# Patient Record
Sex: MALE | Race: AMERICAN INDIAN OR ALASKA NATIVE
[De-identification: names, ages, dates, MRNs, and addresses within clinical notes are randomized per-mention and may not be internally consistent; named-entity substitution may affect disease eponyms.]

---

## 2020-05-04 ENCOUNTER — HOSPITAL ENCOUNTER (EMERGENCY)
Dept: HOSPITAL 5 - ED | Age: 65
Discharge: HOME | End: 2020-05-04
Payer: MEDICARE

## 2020-05-04 VITALS — SYSTOLIC BLOOD PRESSURE: 138 MMHG | DIASTOLIC BLOOD PRESSURE: 79 MMHG

## 2020-05-04 DIAGNOSIS — Z79.899: ICD-10-CM

## 2020-05-04 DIAGNOSIS — D64.9: ICD-10-CM

## 2020-05-04 DIAGNOSIS — M25.561: Primary | ICD-10-CM

## 2020-05-04 DIAGNOSIS — M25.461: ICD-10-CM

## 2020-05-04 DIAGNOSIS — I10: ICD-10-CM

## 2020-05-04 DIAGNOSIS — Z86.73: ICD-10-CM

## 2020-05-04 DIAGNOSIS — I25.2: ICD-10-CM

## 2020-05-04 DIAGNOSIS — Z98.890: ICD-10-CM

## 2020-05-04 LAB
BASOPHILS # (AUTO): 0.1 K/MM3 (ref 0–0.1)
BASOPHILS NFR BLD AUTO: 1 % (ref 0–1.8)
CRP SERPL-MCNC: 3.4 MG/DL (ref 0–1.3)
EOSINOPHIL # BLD AUTO: 0.1 K/MM3 (ref 0–0.4)
EOSINOPHIL NFR BLD AUTO: 1.2 % (ref 0–4.3)
HCT VFR BLD CALC: 27.5 % (ref 35.5–45.6)
HGB BLD-MCNC: 9 GM/DL (ref 11.8–15.2)
LYMPHOCYTES # BLD AUTO: 1.3 K/MM3 (ref 1.2–5.4)
LYMPHOCYTES NFR BLD AUTO: 18 % (ref 13.4–35)
MCHC RBC AUTO-ENTMCNC: 33 % (ref 32–34)
MCV RBC AUTO: 101 FL (ref 84–94)
MONOCYTES # (AUTO): 0.3 K/MM3 (ref 0–0.8)
MONOCYTES % (AUTO): 4.8 % (ref 0–7.3)
PLATELET # BLD: 356 K/MM3 (ref 140–440)
RBC # BLD AUTO: 2.73 M/MM3 (ref 3.65–5.03)
URATE SERPL-MCNC: 4.7 MG/DL (ref 3.5–7.6)

## 2020-05-04 PROCEDURE — 99284 EMERGENCY DEPT VISIT MOD MDM: CPT

## 2020-05-04 PROCEDURE — 84550 ASSAY OF BLOOD/URIC ACID: CPT

## 2020-05-04 PROCEDURE — 73562 X-RAY EXAM OF KNEE 3: CPT

## 2020-05-04 PROCEDURE — 96372 THER/PROPH/DIAG INJ SC/IM: CPT

## 2020-05-04 PROCEDURE — 86140 C-REACTIVE PROTEIN: CPT

## 2020-05-04 PROCEDURE — 85652 RBC SED RATE AUTOMATED: CPT

## 2020-05-04 PROCEDURE — 85025 COMPLETE CBC W/AUTO DIFF WBC: CPT

## 2020-05-04 PROCEDURE — 36415 COLL VENOUS BLD VENIPUNCTURE: CPT

## 2020-05-04 NOTE — EMERGENCY DEPARTMENT REPORT
ED Extremity Problem HPI





- General


Chief complaint: Extremity Injury, Lower


Stated complaint: RT KNEE PAIN


Time Seen by Provider: 05/04/20 18:19


Source: patient


Mode of arrival: Ambulatory


Limitations: No Limitations





- History of Present Illness


Initial comments: 





Patient is a 64-year-old male who presents emergency room with complaints of 

right knee swelling and pain that began yesterday.  He states he has increased 

warmth.  He denies any fall or injury.  He states that he does walk with a cane.

 He denies any numbness or weakness.  He denies ever having this in the past.  

He denies any fever, chills, nausea vomiting diarrhea, any other symptoms.  He 

denies any allergies to medications.


Severity scale (0 -10): 10





- Related Data


                                  Previous Rx's











 Medication  Instructions  Recorded  Last Taken  Type


 


Amiodarone [Cordarone 200 MG TAB] 400 mg PO BID #60 tablet 07/24/15 Unknown Rx


 


Famotidine [Pepcid] 20 mg PO BID #60 tablet 07/24/15 Unknown Rx


 


Folic Acid [Folvite] 1 mg PO QDAY #30 tablet 07/24/15 Unknown Rx


 


Multivitamin Tab W-MINERAL 1 each PO QDAY #30 tablet 07/24/15 Unknown Rx





[Multiple Vitamin/Mineral    





(Theragran M)]    


 


Simvastatin (Nf) [Zocor TAB] 20 mg PO QHS #30 tablet 07/24/15 Unknown Rx


 


Thiamine [Vitamin B-1] 100 mg PO QDAY #30 tablet 07/24/15 Unknown Rx


 


lisinopriL [Zestril TAB] 5 mg PO QDAY #30 tablet 07/24/15 Unknown Rx


 


Doxycycline Hyclate [Doxycycline 100 mg PO BID 10 Days #20 tab 05/04/20 Unknown 

Rx





Hyclate TAB]    


 


HYDROcodone/APAP 5-325 [[place] 1 each PO Q6HR PRN #10 tablet 05/04/20 Unknown Rx





5/325]    


 


Ibuprofen [Motrin 600 MG tab] 600 mg PO Q8H PRN #15 tablet 05/04/20 Unknown Rx











                                    Allergies











Allergy/AdvReac Type Severity Reaction Status Date / Time


 


No Known Allergies Allergy   Unverified 07/19/15 07:08














ED Review of Systems


ROS: 


Stated complaint: RT KNEE PAIN


Other details as noted in HPI





Comment: All other systems reviewed and negative





ED Past Medical Hx





- Past Medical History


Previous Medical History?: Yes


Hx Hypertension: Yes


Hx CVA: Yes (8/2015)


Hx Heart Attack/AMI: Yes (9/2015)


Hx Congestive Heart Failure: No


Hx Diabetes: No


Hx Asthma: No


Hx COPD: No


Hx HIV: No





- Surgical History


Past Surgical History?: No


Additional Surgical History: Left Rotator Cuff.  "something with my heart"





- Social History


Smoking Status: Never Smoker


Substance Use Type: None





- Medications


Home Medications: 


                                Home Medications











 Medication  Instructions  Recorded  Confirmed  Last Taken  Type


 


Amiodarone [Cordarone 200 MG TAB] 400 mg PO BID #60 tablet 07/24/15  Unknown Rx


 


Famotidine [Pepcid] 20 mg PO BID #60 tablet 07/24/15  Unknown Rx


 


Folic Acid [Folvite] 1 mg PO QDAY #30 tablet 07/24/15  Unknown Rx


 


Multivitamin Tab W-MINERAL 1 each PO QDAY #30 tablet 07/24/15  Unknown Rx





[Multiple Vitamin/Mineral     





(Theragran M)]     


 


Simvastatin (Nf) [Zocor TAB] 20 mg PO QHS #30 tablet 07/24/15  Unknown Rx


 


Thiamine [Vitamin B-1] 100 mg PO QDAY #30 tablet 07/24/15  Unknown Rx


 


lisinopriL [Zestril TAB] 5 mg PO QDAY #30 tablet 07/24/15  Unknown Rx


 


Doxycycline Hyclate [Doxycycline 100 mg PO BID 10 Days #20 tab 05/04/20  Unknown

 Rx





Hyclate TAB]     


 


HYDROcodone/APAP 5-325 [[place] 1 each PO Q6HR PRN #10 tablet 05/04/20  Unknown Rx





5/325]     


 


Ibuprofen [Motrin 600 MG tab] 600 mg PO Q8H PRN #15 tablet 05/04/20  Unknown Rx














ED Physical Exam





- General


Limitations: No Limitations


General appearance: alert, in no apparent distress





- Head


Head exam: Present: atraumatic, normocephalic





- Eye


Eye exam: Present: normal appearance





- ENT


ENT exam: Present: mucous membranes moist





- Extremities Exam


Extremities exam: Present: other (edema present to the right knee mostly 

anterior, generalized ttp of the right knee, decreased ROM of the right knee 

secondary to pain, mild erythema of the right anterior knee, neurovascularly 

intact, no calf ttp, no posterior knee ttp)





- Neurological Exam


Neurological exam: Present: alert, oriented X3





- Psychiatric


Psychiatric exam: Present: normal affect, normal mood





- Skin


Skin exam: Present: warm, dry





ED Course


                                   Vital Signs











  05/04/20 05/04/20 05/04/20





  17:29 20:02 20:05


 


Temperature 98.6 F  98.8 F


 


Pulse Rate 100 H  92 H


 


Respiratory 18 18 18





Rate   


 


Blood Pressure 132/68  


 


Blood Pressure   138/79





[Left]   


 


O2 Sat by Pulse 100  97





Oximetry   














- Consultations


Consultation #1: 





05/04/20 20:50


Discussed case with Dr. Esquivel, orthopedic, he believes most likely due to 

inflammatory arthritis, he does not believe it is due to a septic joint or 

infection, he states to give the patient Motrin, he states to have the patient 

walk with a cane or crutches, he states patient can follow-up with his primary 

care doctor or with Dr. Esquivel.


05/04/20 20:57


Discussed case with Dr. Lewis, ER attending who recommended to cover patient 

with antibiotics such and to discuss strict return precautions











ED Medical Decision Making





- Lab Data


Result diagrams: 


                                 05/04/20 19:44











                                   Lab Results











  05/04/20 05/04/20 Range/Units





  19:44 19:44 


 


WBC  7.1   (4.5-11.0)  K/mm3


 


RBC  2.73 L   (3.65-5.03)  M/mm3


 


Hgb  9.0 L   (11.8-15.2)  gm/dl


 


Hct  27.5 L   (35.5-45.6)  %


 


MCV  101 H   (84-94)  fl


 


MCH  33 H   (28-32)  pg


 


MCHC  33   (32-34)  %


 


RDW  14.3   (13.2-15.2)  %


 


Plt Count  356   (140-440)  K/mm3


 


Lymph % (Auto)  18.0   (13.4-35.0)  %


 


Mono % (Auto)  4.8   (0.0-7.3)  %


 


Eos % (Auto)  1.2   (0.0-4.3)  %


 


Baso % (Auto)  1.0   (0.0-1.8)  %


 


Lymph #  1.3   (1.2-5.4)  K/mm3


 


Mono #  0.3   (0.0-0.8)  K/mm3


 


Eos #  0.1   (0.0-0.4)  K/mm3


 


Baso #  0.1   (0.0-0.1)  K/mm3


 


Seg Neutrophils %  75.0 H   (40.0-70.0)  %


 


Seg Neutrophils #  5.3   (1.8-7.7)  K/mm3


 


ESR  84   (0-20)  mm/Hr


 


Uric Acid   4.7  (3.5-7.6)  mg/dL


 


C-Reactive Protein   3.40 H  (0.00-1.30)  mg/dL














- Radiology Data


Radiology results: report reviewed





RIGHT KNEE 3 VIEWS 





INDICATION / CLINICAL INFORMATION: 


Right knee pain and swelling. 





COMPARISON: 


None available. 





FINDINGS: 





BONES / JOINT(S): No acute fracture or subluxation. There is spurring involving 

the anterior 


 patella. 


SOFT TISSUES: There is moderate localized soft tissue swelling anterior to the 

patella and along 


 the medial aspect of the knee. 





ADDITIONAL FINDINGS: None. 





IMPRESSION: Moderate soft tissue swelling predominantly anterior to the patella 

is characteristic 


 of soft tissue injury. No evidence of fracture. 





Signer Name: Cristiano Hughes MD 


Signed: 5/4/2020 6:59 PM 


Workstation Name: [id number] 








 Transcribed By: RT 


 Dictated By: Cristiano Hughes MD 


 Electronically Authenticated By: Cristiano Hughes MD 


 Signed Date/Time: 05/04/20 1859 











 DD/DT: 05/04/20 1857 


 TD/TT: 





- Medical Decision Making





Patient is a 64-year-old male who presents emergency room with complaints of 

right knee swelling and pain that began yesterday.  He states he has increased 

warmth.  He denies any fall or injury.  He states that he does walk with a cane.

  He denies any numbness or weakness.  He denies ever having this in the past.  

He denies any fever, chills, nausea vomiting diarrhea, any other symptoms.  He 

denies any allergies to medications.  Vitals are stable.  On exam: edema present

 to the right knee mostly anterior, generalized ttp of the right knee, decreased

 ROM of the right knee secondary to pain, mild erythema of the right anterior kn

ee, neurovascularly intact, no calf ttp, no posterior knee ttp. XR right knee: 

Moderate soft tissue swelling predominantly anterior to the patella is 

characteristic of soft tissue injury. No evidence of fracture. labs with 

macrocytic anemia, H/H stable at 9/27.5, no leukocytosis, ESR is normal, uric 

acid is normal, CRP is mildly elevated. Discussed case with Dr. Esquivel, 

orthopedic, he believes most likely due to inflammatory arthritis, he does not 

believe it is due to a septic joint or infection, he states to give the patient 

Motrin, he states to have the patient walk with a cane or crutches, he states 

patient can follow-up with his primary care doctor or with Dr. Esquivel. Discussed

 case with Dr. Lewis, ER attending who recommended to cover patient with 

antibiotics such and to discuss strict return precautions.  Patient given 

doxycycline, Norco, ibuprofen. adivsed pt Please take medication as prescribed. 

 Do not drive or operate heavy machinery while taking pain medication.  Please 

follow-up with orthopedic doctor.  Please follow-up with your primary care 

doctor.  Please discuss your anemia with your primary care doctor.  Please use 

your cane while walking to decrease weightbearing.  Return to the emergency room

 immediately for any new or worsening symptoms including but not limited to 

worsening swelling, worsening redness, worsening pain, fever, etc.





- Differential Diagnosis


RA, gout, arthritis, septic joint, cellulitis, bursitis


Critical care attestation.: 


If time is entered above; I have spent that time in minutes in the direct care 

of this critically ill patient, excluding procedure time.








ED Disposition


Clinical Impression: 


 Swelling of knee joint, right





Right knee pain


Qualifiers:


 Chronicity: acute Qualified Code(s): M25.561 - Pain in right knee





Anemia


Qualifiers:


 Anemia type: unspecified type Qualified Code(s): D64.9 - Anemia, unspecified





Disposition: DC-01 TO HOME OR SELFCARE


Is pt being admited?: No


Does the pt Need Aspirin: No


Condition: Stable


Instructions:  Knee Pain (ED), Anemia (ED)


Additional Instructions: 


Please take medication as prescribed.  Do not drive or operate heavy machinery 

while taking pain medication.  Please follow-up with orthopedic doctor.  Please 

follow-up with your primary care doctor.  Please discuss your anemia with your 

primary care doctor.  Please use your cane while walking to decrease 

weightbearing.  Return to the emergency room immediately for any new or 

worsening symptoms including but not limited to worsening swelling, worsening 

redness, worsening pain, fever, etc.


Prescriptions: 


Doxycycline Hyclate [Doxycycline Hyclate TAB] 100 mg PO BID 10 Days #20 tab


Ibuprofen [Motrin 600 MG tab] 600 mg PO Q8H PRN #15 tablet


 PRN Reason: Pain


HYDROcodone/APAP 5-325 [[place] 5/325] 1 each PO Q6HR PRN #10 tablet


 PRN Reason: Pain , Severe (7-10)


Referrals: 


MADDY RODRIGUEZ MD [Primary Care Provider] - 2-3 Days


CRISTIANO ESQUIVEL MD [Staff Physician] - 2-3 Days


Time of Disposition: 20:54


Print Language: ENGLISH

## 2020-05-04 NOTE — XRAY REPORT
RIGHT KNEE 3 VIEWS



INDICATION / CLINICAL INFORMATION:

Right knee pain and swelling.



COMPARISON:

None available.

 

FINDINGS:



BONES / JOINT(S): No acute fracture or subluxation. There is spurring involving the anterior patella.


SOFT TISSUES: There is moderate localized soft tissue swelling anterior to the patella and along the 
medial aspect of the knee.



ADDITIONAL FINDINGS: None.



IMPRESSION: Moderate soft tissue swelling predominantly anterior to the patella is characteristic of 
soft tissue injury. No evidence of fracture.



Signer Name: Cristiano Hughes MD 

Signed: 5/4/2020 6:59 PM

Workstation Name: [id number]

## 2021-07-19 ENCOUNTER — HOSPITAL ENCOUNTER (EMERGENCY)
Dept: HOSPITAL 5 - ED | Age: 66
Discharge: LEFT BEFORE DISCHARGE | End: 2021-07-19
Payer: MEDICARE

## 2021-07-19 VITALS — SYSTOLIC BLOOD PRESSURE: 199 MMHG | DIASTOLIC BLOOD PRESSURE: 94 MMHG

## 2021-07-19 DIAGNOSIS — R42: ICD-10-CM

## 2021-07-19 DIAGNOSIS — I10: Primary | ICD-10-CM

## 2021-07-19 DIAGNOSIS — R51.9: ICD-10-CM

## 2021-07-19 DIAGNOSIS — R26.81: ICD-10-CM

## 2021-07-19 DIAGNOSIS — R07.89: ICD-10-CM

## 2021-07-19 DIAGNOSIS — I25.2: ICD-10-CM

## 2021-07-19 DIAGNOSIS — Z79.899: ICD-10-CM

## 2021-07-19 DIAGNOSIS — Z76.0: ICD-10-CM

## 2021-07-19 DIAGNOSIS — Z86.73: ICD-10-CM

## 2021-07-19 DIAGNOSIS — R55: ICD-10-CM

## 2021-07-19 DIAGNOSIS — Z98.890: ICD-10-CM

## 2021-07-19 DIAGNOSIS — Z88.0: ICD-10-CM

## 2021-07-19 LAB
ALBUMIN SERPL-MCNC: 3.5 G/DL (ref 3.9–5)
ALT SERPL-CCNC: 6 UNITS/L (ref 7–56)
APTT BLD: 34.2 SEC. (ref 24.2–36.6)
BASOPHILS # (AUTO): 0 K/MM3 (ref 0–0.1)
BASOPHILS NFR BLD AUTO: 0.1 % (ref 0–1.8)
BUN SERPL-MCNC: 24 MG/DL (ref 9–20)
BUN/CREAT SERPL: 14 %
CALCIUM SERPL-MCNC: 8.9 MG/DL (ref 8.4–10.2)
EOSINOPHIL # BLD AUTO: 0.1 K/MM3 (ref 0–0.4)
EOSINOPHIL NFR BLD AUTO: 0.9 % (ref 0–4.3)
HCT VFR BLD CALC: 32.5 % (ref 35.5–45.6)
HEMOLYSIS INDEX: 7
HGB BLD-MCNC: 10.8 GM/DL (ref 11.8–15.2)
INR PPP: 1.15 (ref 0.87–1.13)
LYMPHOCYTES # BLD AUTO: 1 K/MM3 (ref 1.2–5.4)
LYMPHOCYTES NFR BLD AUTO: 13 % (ref 13.4–35)
MCHC RBC AUTO-ENTMCNC: 33 % (ref 32–34)
MCV RBC AUTO: 101 FL (ref 84–94)
MONOCYTES # (AUTO): 0.9 K/MM3 (ref 0–0.8)
MONOCYTES % (AUTO): 11.3 % (ref 0–7.3)
PLATELET # BLD: 227 K/MM3 (ref 140–440)
RBC # BLD AUTO: 3.22 M/MM3 (ref 3.65–5.03)

## 2021-07-19 PROCEDURE — 82962 GLUCOSE BLOOD TEST: CPT

## 2021-07-19 PROCEDURE — 82550 ASSAY OF CK (CPK): CPT

## 2021-07-19 PROCEDURE — 84484 ASSAY OF TROPONIN QUANT: CPT

## 2021-07-19 PROCEDURE — 36415 COLL VENOUS BLD VENIPUNCTURE: CPT

## 2021-07-19 PROCEDURE — 85730 THROMBOPLASTIN TIME PARTIAL: CPT

## 2021-07-19 PROCEDURE — 80053 COMPREHEN METABOLIC PANEL: CPT

## 2021-07-19 PROCEDURE — 96360 HYDRATION IV INFUSION INIT: CPT

## 2021-07-19 PROCEDURE — 99284 EMERGENCY DEPT VISIT MOD MDM: CPT

## 2021-07-19 PROCEDURE — 84443 ASSAY THYROID STIM HORMONE: CPT

## 2021-07-19 PROCEDURE — G0480 DRUG TEST DEF 1-7 CLASSES: HCPCS

## 2021-07-19 PROCEDURE — 85670 THROMBIN TIME PLASMA: CPT

## 2021-07-19 PROCEDURE — 71045 X-RAY EXAM CHEST 1 VIEW: CPT

## 2021-07-19 PROCEDURE — 80320 DRUG SCREEN QUANTALCOHOLS: CPT

## 2021-07-19 PROCEDURE — 70450 CT HEAD/BRAIN W/O DYE: CPT

## 2021-07-19 PROCEDURE — 85610 PROTHROMBIN TIME: CPT

## 2021-07-19 PROCEDURE — 82553 CREATINE MB FRACTION: CPT

## 2021-07-19 PROCEDURE — 93005 ELECTROCARDIOGRAM TRACING: CPT

## 2021-07-19 PROCEDURE — 85025 COMPLETE CBC W/AUTO DIFF WBC: CPT

## 2021-07-19 PROCEDURE — 83735 ASSAY OF MAGNESIUM: CPT

## 2021-07-19 NOTE — EMERGENCY DEPARTMENT REPORT
ED General Adult HPI





- General


Chief complaint: Chest Pain


Stated complaint: CHEST PAIN/DIZZY


PUI?: No


Time Seen by Provider: 07/19/21 17:20


Source: patient, RN notes reviewed, old records reviewed


Mode of arrival: Ambulatory


Limitations: Physical Limitation





- History of Present Illness


Initial comments: 





The patient is a 65-year-old gentleman.  The patient has a past medical history 

of alcohol abuse, nonsustained ventricular tachycardia, respiratory failure, 

polysubstance abuse


He has not received his Covid vaccination.





He presents to the ER today with complaint of 1 week intermittent chest pain, 

headache, dizziness, unsteady gait.  Positive congestion.  No loss of taste or 

smell.  No fever.  No vomiting or diaphoresis.  No travel, surgery, 

immobilization, denies DVT and pulmonary embolism risk factors.  He is not mehreen

icidal suicidal.





Symptoms intermittent.  They do not radiate anywhere.  He does not describe 

exacerbating or relieving factors that he is aware of.  He does not have a local

primary care doctor.  Noncompliant with outpatient medications.





He does describe intermittent central chest pain, which does not radiate to the 

back, arms or neck.  This has been intermittent, and is associated with coughing

.





He may have taken aspirin within the past 7 days, but he is not certain.  No 

recent cardiac risk ratification that he is aware of.


-: Gradual, days(s)


Location: head, chest


Consistency: intermittent


Improves with: none


Worsens with: none





- Related Data


                                  Previous Rx's











 Medication  Instructions  Recorded  Last Taken  Type


 


Amiodarone [Cordarone 200 MG TAB] 400 mg PO BID #60 tablet 07/24/15 Unknown Rx


 


Simvastatin (Nf) [Zocor TAB] 20 mg PO QHS #30 tablet 07/24/15 Unknown Rx


 


Aspirin [Aspirin BABY CHEW TAB] 81 mg PO QDAY #30 tab.chew 07/19/21 Unknown Rx


 


Famotidine [Pepcid] 20 mg PO BID #60 tablet 07/19/21 Unknown Rx


 


Folic Acid [Folvite] 1 mg PO QDAY #30 tablet 07/19/21 Unknown Rx


 


Multivitamin Tab W-MINERAL 1 each PO QDAY #30 tablet 07/19/21 Unknown Rx





[Multiple Vitamin/Mineral    





(Theragran M)]    


 


Thiamine [Vitamin B-1] 100 mg PO QDAY #30 tablet 07/19/21 Unknown Rx


 


lisinopriL [Zestril TAB] 5 mg PO QDAY #30 tablet 07/19/21 Unknown Rx











                                    Allergies











Allergy/AdvReac Type Severity Reaction Status Date / Time


 


Penicillins Allergy Mild Rash Verified 07/19/21 18:03














ED Review of Systems


ROS: 


Stated complaint: CHEST PAIN/DIZZY


Other details as noted in HPI





Constitutional: malaise, weakness, other (Denies loss of taste or smell).  

denies: fever


Eyes: denies: eye discharge, vision change


ENT: denies: epistaxis


Respiratory: denies: cough


Cardiovascular: chest pain


Gastrointestinal: denies: nausea, vomiting, hematemesis, melena, hematochezia


Musculoskeletal: myalgia


Neurological: headache, weakness, abnormal gait


Psychiatric: denies: homicidal thoughts, suicidal thoughts





ED Past Medical Hx





- Past Medical History


Previous Medical History?: Yes


Hx Hypertension: Yes


Hx CVA: Yes (8/2015)


Hx Heart Attack/AMI: Yes (9/2015)


Hx Congestive Heart Failure: No


Hx Diabetes: No


Hx Asthma: No


Hx COPD: No


Hx HIV: No





- Surgical History


Additional Surgical History: Left Rotator Cuff.  "something with my heart"





- Social History


Smoking Status: Never Smoker


Substance Use Type: None





- Medications


Home Medications: 


                                Home Medications











 Medication  Instructions  Recorded  Confirmed  Last Taken  Type


 


Amiodarone [Cordarone 200 MG TAB] 400 mg PO BID #60 tablet 07/24/15  Unknown Rx


 


Simvastatin (Nf) [Zocor TAB] 20 mg PO QHS #30 tablet 07/24/15  Unknown Rx


 


Aspirin [Aspirin BABY CHEW TAB] 81 mg PO QDAY #30 tab.chew 07/19/21  Unknown Rx


 


Famotidine [Pepcid] 20 mg PO BID #60 tablet 07/19/21  Unknown Rx


 


Folic Acid [Folvite] 1 mg PO QDAY #30 tablet 07/19/21  Unknown Rx


 


Multivitamin Tab W-MINERAL 1 each PO QDAY #30 tablet 07/19/21  Unknown Rx





[Multiple Vitamin/Mineral     





(Theragran M)]     


 


Thiamine [Vitamin B-1] 100 mg PO QDAY #30 tablet 07/19/21  Unknown Rx


 


lisinopriL [Zestril TAB] 5 mg PO QDAY #30 tablet 07/19/21  Unknown Rx














ED Physical Exam





- General


Limitations: Physical Limitation


General appearance: alert, anxious, obese





- Head


Head exam: Present: atraumatic, normocephalic





- Eye


Eye exam: Present: normal appearance, PERRL, EOMI.  Absent: nystagmus





- ENT


ENT exam: Present: normal exam, normal orophraynx, mucous membranes moist, 

normal external ear exam, other (Tongue fasciculations noted)





- Neck


Neck exam: Present: normal inspection, full ROM.  Absent: tenderness, 

meningismus





- Respiratory


Respiratory exam: Present: normal lung sounds bilaterally.  Absent: respiratory 

distress, wheezes, rales, rhonchi, stridor, decreased breath sounds





- Cardiovascular


Cardiovascular Exam: Present: normal rhythm, tachycardia, normal heart sounds.  

Absent: bradycardia, irregular rhythm, systolic murmur, diastolic murmur, rubs, 

gallop





- GI/Abdominal


GI/Abdominal exam: Present: soft.  Absent: distended, tenderness, guarding, 

rebound, rigid, pulsatile mass





- Rectal


Rectal exam: Present: deferred





- Extremities Exam


Extremities exam: Present: full ROM, pedal edema (1+ edema noted in the 

bilateral lower extremity), other (2+ pulses noted in the bilateral upper and 

lower extremities.  There is no palpable cord.   negative Homans sign.  Muscular

compartments are soft.  The pelvis is stable.).  Absent: normal inspection 

(Hyperpigmentation and chronic venous stasis changes noted in the bilateral 

lower extremities)





- Back Exam


Back exam: Present: normal inspection.  Absent: tenderness, CVA tenderness (R), 

CVA tenderness (L), paraspinal tenderness, vertebral tenderness





- Neurological Exam


Neurological exam: Present: alert, oriented X3, abnormal gait (Positive Romberg 

examination.  Unable to tolerate tandem gait.  Regular gait within normal 

limits), other (No facial droop.  Tongue midline.  Extraocular movements intact 

bilaterally.  Facial sensation intact to light touch in V1, V2, V3 distribution 

bilaterally.  5 and a 5 strength in 4 extremities.  Sensation intact to light 

touch in 4 extremities.).  Absent: motor sensory deficit





- Psychiatric


Psychiatric exam: Present: anxious.  Absent: homicidal ideation, suicidal 

ideation





- Skin


Skin exam: Present: warm, dry, intact, normal color.  Absent: rash





ED Course


                                   Vital Signs











  07/19/21 07/19/21 07/19/21





  17:11 17:43 17:45


 


Temperature 99 F  


 


Pulse Rate 101 H 92 H 100 H


 


Respiratory 22 17 25 H





Rate   


 


Blood Pressure   175/88


 


Blood Pressure 167/88  





[Right]   


 


O2 Sat by Pulse 97 79 L 91





Oximetry   














  07/19/21 07/19/21 07/19/21





  18:01 18:21 18:31


 


Temperature   


 


Pulse Rate 94 H  


 


Respiratory 21  





Rate   


 


Blood Pressure 192/91 192/91 188/94


 


Blood Pressure   





[Right]   


 


O2 Sat by Pulse 99 98 98





Oximetry   














- Reevaluation(s)


Reevaluation #1: 





07/19/21 18:10


Differential diagnosis, including but not limited to: Dehydration, alcohol 

dependence, electrolyte derangement, alcohol induced gastritis, GERD, gastritis,

 hiatal hernia, pneumonia, acute coronary syndrome, Warnicke's encephalopathy, 

polysubstance abuse, peripheral neuropathy,








Assessment and plan:





65-year-old gentleman with a complaint of headache, chest pain with coughing, 

dizziness, near syncope.  Tachycardia resolved, he is not currently tachycardic,

 tachypneic or hypoxic, he denies travel, surgery, immobilization, and DVT, 

pulmonary embolism risk factors, and he is low risk by Wells criteria.





He is ataxic, this has been going on for about a week, as per the patient.  

Therefore, not a TPA candidate as symptoms present for greater than 4.5 hours.  

Examination not suggestive of large vessel occlusion, and symptoms present for g

reater than 24 hours, therefore, no emergent need for endovascular/arterial 

imaging of the head and neck.





Mild tongue fasciculations noted, known history of polysubstance abuse, the may 

be a component of alcohol withdrawal.  Place patient on cardiac monitor.  Obtain

 noncontrast CT scan of the brain, appropriate laboratory studies, EKG, x-ray of

 the chest.  Treat with banana bag, as well as 500 mg of thiamine.  No nystagmus

 is noted.





I recommended admission to the medical service once initial diagnostics have 

resulted.  I discussed this plan of care with the patient.  He is not certain if

 he would like to be admitted.  He is currently awake, alert, oriented, sober, 

and exhibits decision-making capacity.  Risks of leaving AGAINST MEDICAL ADVICE 

were discussed with the patient, specifically risks of death, disability, 

paralysis, loss of quality of life.  This conversation was witnessed by nurse 

Britta Lisa.


The patient is currently awake, alert, oriented, exhibits decision-making cap

city, and is able to articulate these risks in his own words.





Reassess after initial data points.


07/19/21 18:51





Laboratory studies, radiology studies are reviewed and appreciated.  Patient 

resting comfortably in stretcher at this time.  He does not appear to be in any 

acute distress.  I have again strongly reiterated my recommendation for 

admission for the aforementioned.





The patient is declining/refusing to be admitted.  He understands the risks of 

leaving AGAINST MEDICAL ADVICE, including death, disability, paralysis, stroke, 

and permanent loss of quality of life.  He understands that he may return to the

 emergency room right away if and when he changes his mind.





Return precautions are reviewed.








ED Medical Decision Making





- Lab Data


Result diagrams: 


                                 07/19/21 17:51





                                 07/19/21 17:51








                                   Vital Signs











  07/19/21





  17:11


 


Temperature 99 F


 


Pulse Rate 101 H


 


Respiratory 22





Rate 


 


Blood Pressure 167/88





[Right] 


 


O2 Sat by Pulse 97





Oximetry 











                                   Lab Results











  07/19/21 Range/Units





  17:51 


 


WBC  7.8  (4.5-11.0)  K/mm3


 


RBC  3.22 L  (3.65-5.03)  M/mm3


 


Hgb  10.8 L  (11.8-15.2)  gm/dl


 


Hct  32.5 L  (35.5-45.6)  %


 


MCV  101 H  (84-94)  fl


 


MCH  34 H  (28-32)  pg


 


MCHC  33  (32-34)  %


 


RDW  13.9  (13.2-15.2)  %


 


Plt Count  227  (140-440)  K/mm3


 


Lymph % (Auto)  13.0 L  (13.4-35.0)  %


 


Mono % (Auto)  11.3 H  (0.0-7.3)  %


 


Eos % (Auto)  0.9  (0.0-4.3)  %


 


Baso % (Auto)  0.1  (0.0-1.8)  %


 


Lymph # (Auto)  1.0 L  (1.2-5.4)  K/mm3


 


Mono # (Auto)  0.9 H  (0.0-0.8)  K/mm3


 


Eos # (Auto)  0.1  (0.0-0.4)  K/mm3


 


Baso # (Auto)  0.0  (0.0-0.1)  K/mm3


 


Seg Neutrophils %  74.7 H  (40.0-70.0)  %


 


Seg Neutrophils #  5.8  (1.8-7.7)  K/mm3














- EKG Data


-: EKG Interpreted by Me


EKG shows normal: sinus rhythm


Rate: normal





- EKG Data





07/19/21 18:09


The EKG today is interpreted at 17: 40





Sinus rhythm, 89 bpm.  Normal axis, normal intervals, nonspecific T wave 

abnormalities.  Left ventricular hypertrophy.  Abnormal EKG.  Not a STEMI.





- Radiology Data


Radiology results: pending, report reviewed, image reviewed





CHEST 1 VIEW  INDICATION / CLINICAL INFORMATION: Acute chest pain.  COMPARISON: 

4/3/2016  FINDINGS:  SUPPORT DEVICES: None.  HEART / MEDIASTINUM: No significant

 abnormality.  LUNGS / PLEURA: No significant pulmonary or pleural abnormality. 

No pneumothorax.  ADDITIONAL FINDINGS: No significant additional findings.  

IMPRESSION: 1. No acute findings. No interval change.  Signer Name: Aleena Cleveland MD Signed: 7/19/2021 5:13 PM Workstation Name: [email or web address]


Critical care attestation.: 


If time is entered above; I have spent that time in minutes in the direct care 

of this critically ill patient, excluding procedure time.








ED Disposition


Clinical Impression: 


 Dizziness, Near syncope, Acute chest pain, Headache, HTN (hypertension), 

Medication refill, Unsteady gait, Non-compliance





Disposition: DC-07 LEFT AGAINST MED ADVICE


Is pt being admited?: No


Does the pt Need Aspirin: No


Condition: Good


Instructions:  Chest Pain (ED), Nonspecific Chest Pain, Adult, Near-Syncope, 

Easy-to-Read, Hypertension (ED)


Additional Instructions: 


As we discussed, you have left the hospital/emergency room AGAINST MEDICAL 

ADVICE.  By leaving, you risked death, disability, paralysis, permanent loss of 

quality of life.  The ER is open 24 hours a day, 7 days a week.  It never 

closes.  Please return to the emergency room right away if and when you change 

your mind.  If you decide not to return to the emergency room, please follow-up 

with the listed physician referrals as soon as possible.





Do not drive or operate motor vehicles for the next 6 months, or until cleared 

to do so by a primary care doctor or cardiologist or neurologist.  For the 

patient's convenience, a number of local primary care/cardiology/neurology 

specialists have been listed that he may follow-up with.


Referrals: 


MARCOS JAEGER MD [Staff Physician] - 3-5 Days


ANUJA MARTINO MD [Staff Physician] - 3-5 Days


MILANA LIU MD [Referring] - 3-5 Days





Heart Score





- HEART Score


History: Slightly suspicious


EKG: Non-specific


Age: > 65


Risk factors: 1-2 risk factors


Troponin: < normal limit


HEART Score: 4





- EKG Read Time


Time EKG Completed: 17:40


EKG Read Time: 17:40





- Critical Actions


Critical Actions: 4-6 pts:12-16.6% risk of adverse cardiac event. Should be 

admitted





- Assessment


Assessment Interval: Baseline





- Level of Consciousness


1a. Level of Consciousness: alert/keenly responsive





- LOC Questions


1b. LOC Questions: answers both correctly





- LOC Command


1c. LOC Commands: performs tasks correctly





- Best Gaze


2. Best Gaze: normal





- Visual


3. Visual: no visual loss





- Facial Palsy


4. Facial Palsy: normal symmetrical movement





- Motor Arm


5a. Motor Arm Left: no drift


5b. Motor Arm Right: no drift





- Motor Leg


6a. Motor Leg Left: no drift


6b. Motor Leg Right: no drift





- Limb Ataxia


7. Limb Ataxia: absent





- Sensory


8. Sensory: normal





- Best Language


9. Best Language: no aphasia





- Dysarthria


10. Dysarthria: normal





- Extinction and Inattention


11. Extinction/Inattention: no abnormality





- Scoring


Total Score: 0


Stroke Severity: No Stroke Symptoms

## 2021-07-19 NOTE — XRAY REPORT
CHEST 1 VIEW 



INDICATION / CLINICAL INFORMATION:

Acute chest pain.



COMPARISON: 

4/3/2016



FINDINGS:



SUPPORT DEVICES: None.



HEART / MEDIASTINUM: No significant abnormality. 



LUNGS / PLEURA: No significant pulmonary or pleural abnormality. No pneumothorax. 



ADDITIONAL FINDINGS: No significant additional findings.



IMPRESSION:

1. No acute findings. No interval change.



Signer Name: Aleena Cleveland MD 

Signed: 7/19/2021 6:13 PM

Workstation Name: [email or web address]

## 2021-07-19 NOTE — CAT SCAN REPORT
CT head/brain wo con



INDICATION / CLINICAL INFORMATION:

65 years Male; Headache, dizziness, near syncope.



TECHNIQUE: Routine CT head without contrast. All CT scans at this location are performed using CT dos
e reduction for ALARA by means of automated exposure control.



COMPARISON: 

CT-7/19/2015



FINDINGS:



BRAIN / INTRACRANIAL CONTENTS: Small, lacunar infarct is seen in the left lentiform nucleus anteriorl
y. This finding is suggested on prior study.



Otherwise, no acute hemorrhage, mass effect, midline shift, hydrocephalus, or acute, large territoria
l infarct. No signs of significant atrophy or chronic infarct. 



There are mild to moderate areas of decreased attenuation in the white matter of the cerebral hemisph
eres. These are nonspecific findings and may be related to microangiopathy (hypertension, diabetes, a
therosclerosis), given the patient's age.



CRANIOCERVICAL JUNCTION: No significant abnormality.



ORBITS: No significant abnormality of visualized orbits.

SINUSES / MASTOIDS: Mild to moderate mucosal thickening in the maxillary antra, with small air-fluid 
levels noted as well. Similar findings in the sphenoid sinuses. Partial opacification of the ethmoids
 noted.



ADDITIONAL FINDINGS: Atherosclerotic disease is seen in the anterior circulation.



IMPRESSION:

1. No focal mass, hemorrhage, hydrocephalus, or acute, large territorial infarct. Follow-up with diff
usion imaging by MRI, as clinically warranted. 

 



Signer Name: Alfredito Amos MD, III 

Signed: 7/19/2021 6:36 PM

Workstation Name: [email or web address]

## 2021-07-20 NOTE — ELECTROCARDIOGRAPH REPORT
[facility]

                                       

Test Date:    2021               Test Time:    17:40:46

Pat Name:     RADHA ELDER             Department:   

Patient ID:   [id number]          Room:          

Gender:       M                        Technician:   MARISSA

:          1955               Requested By: WILBERTO SANTOS

Order Number: [id number]              Reading MD:   Annie Dalton

                                 Measurements

Intervals                              Axis          

Rate:         89                       P:            78

ND:           169                      QRS:          61

QRSD:         74                       T:            74

QT:           332                                    

QTc:          405                                    

                           Interpretive Statements

Sinus rhythm

Probable left atrial enlargement

Nonspecific T abnrm, anterolateral leads

No previous ECG available for comparison

Electronically Signed On 2021 10:21:42 EDT by Annie Dalton

## 2021-10-12 ENCOUNTER — HOSPITAL ENCOUNTER (EMERGENCY)
Dept: HOSPITAL 5 - ED | Age: 66
Discharge: LEFT BEFORE DISCHARGE | End: 2021-10-12
Payer: MEDICARE

## 2021-10-12 DIAGNOSIS — I63.9: ICD-10-CM

## 2021-10-12 DIAGNOSIS — I10: ICD-10-CM

## 2021-10-12 DIAGNOSIS — Z98.890: ICD-10-CM

## 2021-10-12 DIAGNOSIS — I47.1: Primary | ICD-10-CM

## 2021-10-12 LAB
BASOPHILS # (AUTO): 0 K/MM3 (ref 0–0.1)
BASOPHILS NFR BLD AUTO: 0.5 % (ref 0–1.8)
BILIRUB UR QL STRIP: (no result)
BLOOD UR QL VISUAL: (no result)
BUN SERPL-MCNC: 26 MG/DL (ref 9–20)
BUN/CREAT SERPL: 14 %
CALCIUM SERPL-MCNC: 8.7 MG/DL (ref 8.4–10.2)
EOSINOPHIL # BLD AUTO: 0 K/MM3 (ref 0–0.4)
EOSINOPHIL NFR BLD AUTO: 0.2 % (ref 0–4.3)
HCT VFR BLD CALC: 24.7 % (ref 35.5–45.6)
HEMOLYSIS INDEX: 0
HGB BLD-MCNC: 8.3 GM/DL (ref 11.8–15.2)
INR PPP: 1.23 (ref 0.87–1.13)
LYMPHOCYTES # BLD AUTO: 1.7 K/MM3 (ref 1.2–5.4)
LYMPHOCYTES NFR BLD AUTO: 22.1 % (ref 13.4–35)
MCHC RBC AUTO-ENTMCNC: 34 % (ref 32–34)
MCV RBC AUTO: 97 FL (ref 84–94)
MONOCYTES # (AUTO): 0.6 K/MM3 (ref 0–0.8)
MONOCYTES % (AUTO): 7.5 % (ref 0–7.3)
PH UR STRIP: 7 [PH] (ref 5–7)
PLATELET # BLD: 355 K/MM3 (ref 140–440)
PROT UR STRIP-MCNC: (no result) MG/DL
RBC # BLD AUTO: 2.55 M/MM3 (ref 3.65–5.03)
RBC #/AREA URNS HPF: 6 /HPF (ref 0–6)
UROBILINOGEN UR-MCNC: < 2 MG/DL (ref ?–2)
WBC #/AREA URNS HPF: 109 /HPF (ref 0–6)

## 2021-10-12 PROCEDURE — 36415 COLL VENOUS BLD VENIPUNCTURE: CPT

## 2021-10-12 PROCEDURE — 85025 COMPLETE CBC W/AUTO DIFF WBC: CPT

## 2021-10-12 PROCEDURE — 80048 BASIC METABOLIC PNL TOTAL CA: CPT

## 2021-10-12 PROCEDURE — 84443 ASSAY THYROID STIM HORMONE: CPT

## 2021-10-12 PROCEDURE — 83735 ASSAY OF MAGNESIUM: CPT

## 2021-10-12 PROCEDURE — 71046 X-RAY EXAM CHEST 2 VIEWS: CPT

## 2021-10-12 PROCEDURE — 85610 PROTHROMBIN TIME: CPT

## 2021-10-12 PROCEDURE — 81001 URINALYSIS AUTO W/SCOPE: CPT

## 2021-10-12 PROCEDURE — 99284 EMERGENCY DEPT VISIT MOD MDM: CPT

## 2021-10-12 NOTE — XRAY REPORT
CHEST 2 VIEWS 



INDICATION / CLINICAL INFORMATION: Dysrhythmia.



COMPARISON: 7/19/2021



FINDINGS:



SUPPORT DEVICES: None.

HEART / MEDIASTINUM: No significant abnormality. 

LUNGS / PLEURA: No significant pulmonary or pleural abnormality. No pneumothorax. 



ADDITIONAL FINDINGS: No significant additional findings.



IMPRESSION:

1. No acute findings.



Signer Name: Jb Moncada MD 

Signed: 10/12/2021 9:03 PM

Workstation Name: [id number]

## 2021-10-12 NOTE — EMERGENCY DEPARTMENT REPORT
HPI





- General


Chief Complaint: Weakness


Time Seen by Provider: 10/12/21 22:19





- HPI


HPI: 





Room 44








The patient is a 65-year-old male present with a chief complaint of SVT.  The 

patient states he was standing cooking food when he developed palpitations.  P

atient states his chest began to hurt at the same time he developed some 

shortness of breath.  Patient denies nausea/vomiting or diaphoresis.  EMS was 

called and found the patient to be in SVT (no rhythm strip provided by EMS).  

The patient was reportedly given 6 mg of adenosine with resolution of SVT.  The 

patient has a history of nonsustained V. tach but is uncertain if he has had a 

history of SVT in the past.  Patient currently asymptomatic





ED Past Medical Hx





- Past Medical History


Hx Hypertension: Yes


Hx CVA: Yes (2015)


Hx Heart Attack/AMI: Yes (2015)





- Surgical History


Additional Surgical History: Left Rotator Cuff.  "something with my heart"





- Family History


Family history: no significant





- Social History


Smoking Status: Never Smoker


Substance Use Type: None, Alcohol (Occasional)





- Medications


Home Medications: 


                                Home Medications











 Medication  Instructions  Recorded  Confirmed  Last Taken  Type


 


Amiodarone [Cordarone 200 MG TAB] 400 mg PO BID #60 tablet 07/24/15  Unknown Rx


 


Simvastatin (Nf) [Zocor TAB] 20 mg PO QHS #30 tablet 07/24/15  Unknown Rx


 


Aspirin [Aspirin BABY CHEW TAB] 81 mg PO QDAY #30 tab.chew 21  Unknown Rx


 


Famotidine [Pepcid] 20 mg PO BID #60 tablet 21  Unknown Rx


 


Folic Acid [Folvite] 1 mg PO QDAY #30 tablet 21  Unknown Rx


 


Multivitamin Tab W-MINERAL 1 each PO QDAY #30 tablet 21  Unknown Rx





[Multiple Vitamin/Mineral     





(Theragran M)]     


 


Thiamine [Vitamin B-1] 100 mg PO QDAY #30 tablet 21  Unknown Rx


 


lisinopriL [Zestril TAB] 5 mg PO QDAY #30 tablet 21  Unknown Rx














ED Review of Systems


ROS: 


Stated complaint: SVT


Other details as noted in HPI





Constitutional: no symptoms reported


Eyes: denies: eye pain


ENT: denies: throat pain


Respiratory: shortness of breath


Cardiovascular: chest pain, palpitations


Endocrine: no symptoms reported


Gastrointestinal: denies: nausea, vomiting


Musculoskeletal: denies: back pain


Neurological: denies: headache





Physical Exam





- Physical Exam


Vital Signs: 


                                   Vital Signs











  10/12/21 10/12/21 10/12/21





  20:11 20:14 20:15


 


Temperature 98.9 F 100.9 F H 


 


Pulse Rate 104 H 107 H 


 


Respiratory 18 20 





Rate   


 


Blood Pressure  140/78 


 


Blood Pressure 155/97  





[Right]   


 


O2 Sat by Pulse 100 97 96





Oximetry   











Physical Exam: 





GENERAL: The patient is well-developed well-nourished male lying on chair not 

appearing to be in acute distress. []


HEENT: Normocephalic.  Atraumatic.  Extraocular motions are intact.  Patient has

 moist mucous membranes.


NECK: Supple.  Trachea midline


CHEST/LUNGS: Clear to auscultation.  There is no respiratory distress noted.


HEART/CARDIOVASCULAR: Regular.  There is no tachycardia.  There is no gallop rub

 or murmur.


ABDOMEN: Abdomen is soft, nontender.  Patient has normal bowel sounds.  There is

 no abdominal distention.


SKIN: There is no rash.  There is no edema.  There is no diaphoresis.


NEURO: The patient is awake, alert, and oriented.  The patient is cooperative.  

The patient has no focal neurologic deficits.  The patient has normal speech.  

GCS 15 There is no evidence of acute injury.





ED Course


                                   Vital Signs











  10/12/21 10/12/21 10/12/21





  20:11 20:14 20:15


 


Temperature 98.9 F 100.9 F H 


 


Pulse Rate 104 H 107 H 


 


Respiratory 18 20 





Rate   


 


Blood Pressure  140/78 


 


Blood Pressure 155/97  





[Right]   


 


O2 Sat by Pulse 100 97 96





Oximetry   














ED Medical Decision Making





- Lab Data


Result diagrams: 


                                 10/12/21 20:17





                                 10/12/21 20:17





                                Laboratory Tests











  10/12/21 10/12/21 10/12/21





  20:17 20:17 20:17


 


WBC  7.8  


 


RBC  2.55 L  


 


Hgb  8.3 L  


 


Hct  24.7 L  


 


MCV  97 H  


 


MCH  32  


 


MCHC  34  


 


RDW  15.0  


 


Plt Count  355  


 


Lymph % (Auto)  22.1  


 


Mono % (Auto)  7.5 H  


 


Eos % (Auto)  0.2  


 


Baso % (Auto)  0.5  


 


Lymph # (Auto)  1.7  


 


Mono # (Auto)  0.6  


 


Eos # (Auto)  0.0  


 


Baso # (Auto)  0.0  


 


Seg Neutrophils %  69.7  


 


Seg Neutrophils #  5.4  


 


PT   16.8 H 


 


INR   1.23 H 


 


Sodium    141


 


Potassium    5.5 H


 


Chloride    106.7


 


Carbon Dioxide    25


 


Anion Gap    15


 


BUN    26 H


 


Creatinine    1.9 H


 


Estimated GFR    43


 


BUN/Creatinine Ratio    14


 


Glucose    106 H


 


Calcium    8.7


 


Magnesium    1.70


 


TSH   


 


Urine Color   


 


Urine Turbidity   


 


Urine pH   


 


Ur Specific Gravity   


 


Urine Protein   


 


Urine Glucose (UA)   


 


Urine Ketones   


 


Urine Blood   


 


Urine Nitrite   


 


Urine Bilirubin   


 


Urine Urobilinogen   


 


Ur Leukocyte Esterase   


 


Urine WBC (Auto)   


 


Urine RBC (Auto)   


 


U Epithel Cells (Auto)   


 


Urine Yeast (Budding)   














  10/12/21 10/12/21





  20:17 21:15


 


WBC  


 


RBC  


 


Hgb  


 


Hct  


 


MCV  


 


MCH  


 


MCHC  


 


RDW  


 


Plt Count  


 


Lymph % (Auto)  


 


Mono % (Auto)  


 


Eos % (Auto)  


 


Baso % (Auto)  


 


Lymph # (Auto)  


 


Mono # (Auto)  


 


Eos # (Auto)  


 


Baso # (Auto)  


 


Seg Neutrophils %  


 


Seg Neutrophils #  


 


PT  


 


INR  


 


Sodium  


 


Potassium  


 


Chloride  


 


Carbon Dioxide  


 


Anion Gap  


 


BUN  


 


Creatinine  


 


Estimated GFR  


 


BUN/Creatinine Ratio  


 


Glucose  


 


Calcium  


 


Magnesium  


 


TSH  1.990 


 


Urine Color   Straw


 


Urine Turbidity   Slightly-cloudy


 


Urine pH   7.0


 


Ur Specific Gravity   1.010


 


Urine Protein   <15 mg/dl


 


Urine Glucose (UA)   Neg


 


Urine Ketones   Neg


 


Urine Blood   Sm


 


Urine Nitrite   Neg


 


Urine Bilirubin   Neg


 


Urine Urobilinogen   < 2.0


 


Ur Leukocyte Esterase   Lg


 


Urine WBC (Auto)   109.0 H


 


Urine RBC (Auto)   6.0


 


U Epithel Cells (Auto)   1.0


 


Urine Yeast (Budding)   2+














- Radiology Data


Radiology results: report reviewed (Chest x-ray), image reviewed (Chest x-ray)


interpreted by me: 





Chest x-ray-no definite focal infiltrates, no pneumothorax





[facility] [address] [place] [address] 

XRay Report Signed Patient: RADHA ELDER MR#: [id number] : 1955 

Acct:[id number] Age/Sex: 65 / M ADM Date: 10/12/21 Loc: ED Attending Dr: 

Ordering Physician: WILBERTO SANTOS MD Date of Service: 10/12/21 Procedure(s):

 XR chest routine 2V Accession Number(s): [id number] cc: WILBERTO SANTOS MD 

Fluoro Time In Minutes: CHEST 2 VIEWS INDICATION / CLINICAL INFORMATION: 

Dysrhythmia. COMPARISON: 2021 FINDINGS: SUPPORT DEVICES: None. HEART / 

MEDIASTINUM: No significant abnormality. LUNGS / PLEURA: No significant pulmonar

y or pleural abnormality. No pneumothorax. ADDITIONAL FINDINGS: No significant 

additional findings. IMPRESSION: 1. No acute findings. Signer Name: Jb Perkins MD Signed: 10/12/2021 9:03 PM Workstation Name: [id number] Transcribed 

By: SB Dictated By: JB PERKINS MD Electronically Authenticated By: 

JB PERKINS MD Signed Date/Time: 10/12/21 2103 DD/DT: 10/12/21 2103 TD/TT:

 


Print Cancel 





- Differential Diagnosis


SVT


Critical care attestation.: 


If time is entered above; I have spent that time in minutes in the direct care 

of this critically ill patient, excluding procedure time.








ED Disposition


Clinical Impression: 


 SVT (supraventricular tachycardia)





Disposition: 07 LEFT AGAINST MEDICAL ADVICE


Is pt being admited?: No


Does the pt Need Aspirin: No


Condition: Undetermined


Referrals: 


PRIMARY CARE,MD [Primary Care Provider] - 3-5 Days


Time of Disposition: 22:41 (Patient leaving AMA)

## 2021-10-12 NOTE — EVENT NOTE
Date: 10/12/21





The patient was evaluated in the emergency department for symptoms described in 

the history of present illness.  He/she was evaluated in the context of the 

global COVID-19 pandemic, which necessitated consideration that the patient 

might be at risk for infection with the virus that causes COVID-19.  

Institutional protocols and algorithms that pertain to the evaluation of 

patients at risk for COVID-19 are in a state of rapid change based on 

information released by regulatory bodies including the CDC and federal and 

state organizations.  These policies and algorithms were followed during the 

patient's care in the emergency department.  Please note that these policies, 

procedures and recommendations changed on a rapid basis.





Verbal report received from emergency medical services.  EMS documentation not 

available at time of chart dictation





Medical screening examination:


Patient is a 65-year-old gentleman, who was brought to the hospital by EMS with 

out of hospital SVT.  SVT terminated with 6 mg of adenosine.  The patient is 

currently in a sinus rhythm.  His blood pressure is 155/70.  He denies physical 

pain.  He is moving 4 extremities.  He is clinically sober.  He has a GCS of 15.





Obtain laboratory studies, ED EKG, urinalysis, x-ray the chest, detailed history

and physical to follow

## 2021-10-13 VITALS — DIASTOLIC BLOOD PRESSURE: 80 MMHG | SYSTOLIC BLOOD PRESSURE: 138 MMHG
